# Patient Record
Sex: FEMALE | URBAN - METROPOLITAN AREA
[De-identification: names, ages, dates, MRNs, and addresses within clinical notes are randomized per-mention and may not be internally consistent; named-entity substitution may affect disease eponyms.]

---

## 2017-02-09 ENCOUNTER — OFFICE VISIT (OUTPATIENT)
Dept: PEDIATRICS | Facility: CLINIC | Age: 22
End: 2017-02-09
Payer: OTHER GOVERNMENT

## 2017-02-09 VITALS
SYSTOLIC BLOOD PRESSURE: 124 MMHG | OXYGEN SATURATION: 100 % | HEIGHT: 67 IN | TEMPERATURE: 97.9 F | DIASTOLIC BLOOD PRESSURE: 80 MMHG | WEIGHT: 148 LBS | HEART RATE: 74 BPM | BODY MASS INDEX: 23.23 KG/M2

## 2017-02-09 DIAGNOSIS — Z00.00 ROUTINE GENERAL MEDICAL EXAMINATION AT A HEALTH CARE FACILITY: Primary | ICD-10-CM

## 2017-02-09 DIAGNOSIS — Z23 FLU VACCINE NEED: ICD-10-CM

## 2017-02-09 DIAGNOSIS — Z11.3 SCREEN FOR STD (SEXUALLY TRANSMITTED DISEASE): ICD-10-CM

## 2017-02-09 DIAGNOSIS — Z87.09 HISTORY OF ASTHMA: ICD-10-CM

## 2017-02-09 DIAGNOSIS — Z12.4 SCREENING FOR MALIGNANT NEOPLASM OF CERVIX: ICD-10-CM

## 2017-02-09 PROCEDURE — G0145 SCR C/V CYTO,THINLAYER,RESCR: HCPCS | Performed by: INTERNAL MEDICINE

## 2017-02-09 PROCEDURE — 99395 PREV VISIT EST AGE 18-39: CPT | Mod: 25 | Performed by: INTERNAL MEDICINE

## 2017-02-09 PROCEDURE — 90686 IIV4 VACC NO PRSV 0.5 ML IM: CPT | Performed by: INTERNAL MEDICINE

## 2017-02-09 PROCEDURE — 87491 CHLMYD TRACH DNA AMP PROBE: CPT | Performed by: INTERNAL MEDICINE

## 2017-02-09 PROCEDURE — 90471 IMMUNIZATION ADMIN: CPT | Performed by: INTERNAL MEDICINE

## 2017-02-09 PROCEDURE — 87591 N.GONORRHOEAE DNA AMP PROB: CPT | Performed by: INTERNAL MEDICINE

## 2017-02-09 NOTE — LETTER
[address] [address]  [phone]      February 15, 2017    Amaris Murray  [address] [place] [address]          Dear Amaris,    I am happy to inform you that your recent cervical cancer screening test (PAP smear) was normal.      Preventative screening such as this helps insure your health for years to come.  This test should be repeated in 3 years unless otherwise directed.    You will still need to return to the clinic every year for your annual exam and other preventive tests.    Please contact the clinic if you have further questions.      Sincerely,    Mellissa Del Rio MD/shravan

## 2017-02-09 NOTE — MR AVS SNAPSHOT
After Visit Summary   2/9/2017    Amaris Murray    MRN: [id number]           Patient Information     Date Of Birth          1995        Visit Information        Provider Department      2/9/2017 4:40 PM Mellissa Del Rio MD [facility]        Today's Diagnoses     Routine general medical examination at a health care facility    -  1     Acne vulgaris         Flu vaccine need         History of asthma         Screen for STD (sexually transmitted disease)         Screening for malignant neoplasm of cervix           Care Instructions    Get urine test done today.     Preventive Health Recommendations  Female Ages 18 to 25     Yearly exam:     See your health care provider every year in order to  o Review health changes.   o Discuss preventive care.    o Review your medicines if your doctor has prescribed any.      You should be tested each year for STDs (sexually transmitted diseases).       After age 20, talk to your provider about how often you should have cholesterol testing.      Starting at age 21, get a Pap test every three years. If you have an abnormal result, your doctor may have you test more often.      If you are at risk for diabetes, you should have a diabetes test (fasting glucose).     Shots:     Get a flu shot each year.     Get a tetanus shot every 10 years.     Consider getting the shot (vaccine) that prevents cervical cancer (Gardasil).    Nutrition:     Eat at least 5 servings of fruits and vegetables each day.    Eat whole-grain bread, whole-wheat pasta and brown rice instead of white grains and rice.    Talk to your provider about Calcium and Vitamin D.     Lifestyle    Exercise at least 150 minutes a week each week (30 minutes a day, 5 days a week). This will help you control your weight and prevent disease.    Limit alcohol to one drink per day.    No smoking.     Wear sunscreen to prevent skin cancer.    See your dentist every six months for an exam and  "cleaning.        Follow-ups after your visit        Who to contact     If you have questions or need follow up information about today's clinic visit or your schedule please contact [facility] directly at [phone].  Normal or non-critical lab and imaging results will be communicated to you by MyChart, letter or phone within 4 business days after the clinic has received the results. If you do not hear from us within 7 days, please contact the clinic through MyChart or phone. If you have a critical or abnormal lab result, we will notify you by phone as soon as possible.  Submit refill requests through Jybe or call your pharmacy and they will forward the refill request to us. Please allow 3 business days for your refill to be completed.          Additional Information About Your Visit        Jybe Information     Jybe is an electronic gateway that provides easy, online access to your medical records. With Jybe, you can request a clinic appointment, read your test results, renew a prescription or communicate with your care team.     To sign up for Jybe visit the website at [email or web address]   You will be asked to enter the access code listed below, as well as some personal information. Please follow the directions to create your username and password.     Your access code is: [email or web address]  Expires: 5/10/2017  5:11 PM     Your access code will  in 90 days. If you need help or a new code, please contact your [facility] or call [phone] for assistance.        Care EveryWhere ID     This is your Care EveryWhere ID. This could be used by other organizations to access your [facility] medical records  [id number]        Your Vitals Were     Pulse Temperature Height BMI (Body Mass Index) Pulse Oximetry Last Period    74 97.9  F (36.6  C) (Temporal) 5' 6.75\" (1.695 m) 23.37 kg/m2 100% 2017       Blood Pressure from Last 3 Encounters: "   02/09/17 124/80   10/09/15 120/78   01/07/15 120/82    Weight from Last 3 Encounters:   02/09/17 148 lb (67.132 kg)   10/09/15 140 lb (63.504 kg) (68.92 %*)   01/07/15 145 lb (65.772 kg) (76.98 %*)     * Growth percentiles are based on [facility] 2-20 Years data.              We Performed the Following     CHLAMYDIA TRACHOMATIS PCR     HC FLU VAC PRESRV FREE QUAD SPLIT VIR 3+YRS IM     NEISSERIA GONORRHOEA PCR     Pap imaged thin layer screen only - recommended age 21 - 24 years        Primary Care Provider Office Phone # Fax #    Mellissa Del Rio [phone]       [facility] [address] [facility] [address]        Thank you!     Thank you for choosing [facility]  for your care. Our goal is always to provide you with excellent care. Hearing back from our patients is one way we can continue to improve our services. Please take a few minutes to complete the written survey that you may receive in the mail after your visit with us. Thank you!             Your Updated Medication List - Protect others around you: Learn how to safely use, store and throw away your medicines at www.disposemymeds.org.      Notice  As of 2/9/2017  5:11 PM    You have not been prescribed any medications.

## 2017-02-09 NOTE — Clinical Note
February 9, 2017      Amaris Murray  [address] [place] [address]              To Whom It May Concern,    Amaris Murray was diagnosed with exercise induced asthma during childhood. She has not had any problems in the last two years. The last refill of asthma medication was in July of 2014 but this was prescribed as preventive and it has never been used. She is a  and works out on a regular basis. She has not needed to use asthma medication for these strenuous activities. She has likely outgrown asthma.     Should you have any questions, please feel free to contact me at the address above.            Sincerely,        Mellissa Del Rio MD

## 2017-02-09 NOTE — NURSING NOTE
"Chief Complaint   Patient presents with     Physical     Needs letter stating no more Asthma, no problems with asthma for several years. needs clearance for the        Initial /80 mmHg  Pulse 74  Temp(Src) 97.9  F (36.6  C) (Temporal)  Ht 5' 6.75\" (1.695 m)  Wt 148 lb (67.132 kg)  BMI 23.37 kg/m2  SpO2 100%  LMP 01/20/2017 Estimated body mass index is 23.37 kg/(m^2) as calculated from the following:    Height as of this encounter: 5' 6.75\" (1.695 m).    Weight as of this encounter: 148 lb (67.132 kg).  Medication Reconciliation: complete    "

## 2017-02-09 NOTE — PROGRESS NOTES
SUBJECTIVE:     CC: Amaris Murray is an 21 year old woman who presents for preventive health visit.     Patient needs a letter for the [employer]-she has outgrown her asthma. Patient was diagnosed with exercise-induced asthma during childhood. She believes she has outgrown it. She has not used albuterol for a number of years. Per our chart, the last refill was in July 2014. Patient reported that that was a refill for just in case. She has never used it. 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 She is a , works out 3 times a week. She is also a . She has not needed albuterol for any of these strenuous exercises.    Healthy Habits:    Do you get at least three servings of calcium containing foods daily (dairy, green leafy vegetables, etc.)? yes    Amount of exercise or daily activities, outside of work: 3 day(s) per week    Problems taking medications regularly No    Medication side effects: No    Have you had an eye exam in the past two years? yes    Do you see a dentist twice per year? yes    Do you have sleep apnea, excessive snoring or daytime drowsiness?no        -------------------------------------    Today's PHQ-2 Score:   PHQ-2 ( 1999 Pf[employer]) 1/7/2015 1/6/2014   Q1: Little interest or pleasure in doing things 0 0   Q2: Feeling down, depressed or hopeless 0 0   PHQ-2 Score 0 0       Abuse: Current or Past(Physical, Sexual or Emotional)- No  Do you feel safe in your environment - Yes    Social History   Substance Use Topics     Smoking status: Never Smoker      Smokeless tobacco: Never Used     Alcohol Use: No     The patient does not drink >3 drinks per day nor >7 drinks per week.    Recent Labs   Lab Test 01/13/12   CHOL  146       Reviewed orders with patient.  Reviewed health maintenance and updated orders accordingly - Yes    Mammo Decision Support:  Mammogram not appropriate for this patient based on age.    Pertinent mammograms are reviewed under the imaging  "tab.  History of abnormal Pap smear: NO - age 21-29 PAP every 3 years recommended  All Histories reviewed and updated in Epic.      ROS:  C: NEGATIVE for fever, chills, change in weight  I: NEGATIVE for worrisome rashes, moles or lesions  E: NEGATIVE for vision changes or irritation  ENT: NEGATIVE for ear, mouth and throat problems  R: NEGATIVE for significant cough or SOB  B: NEGATIVE for masses, tenderness or discharge  CV: NEGATIVE for chest pain, palpitations or peripheral edema  GI: NEGATIVE for nausea, abdominal pain, heartburn, or change in bowel habits  : NEGATIVE for unusual urinary or vaginal symptoms. Periods are regular.  M: NEGATIVE for significant arthralgias or myalgia  N: NEGATIVE for weakness, dizziness or paresthesias  P: NEGATIVE for changes in mood or affect    Problem list, Medication list, Allergies, and Medical/Social/Surgical histories reviewed in [facility] and updated as appropriate.  OBJECTIVE:     /80 mmHg  Pulse 74  Temp(Src) 97.9  F (36.6  C) (Temporal)  Ht 5' 6.75\" (1.695 m)  Wt 148 lb (67.132 kg)  BMI 23.37 kg/m2  SpO2 100%  LMP 01/20/2017  EXAM:  GENERAL: healthy, alert and no distress  EYES: Eyes grossly normal to inspection, PERRL and conjunctivae and sclerae normal  HENT: ear canals and TM's normal, nose and mouth without ulcers or lesions  NECK: no adenopathy, no asymmetry, masses, or scars and thyroid normal to palpation  RESP: lungs clear to auscultation - no rales, rhonchi or wheezes  BREAST: normal without masses, tenderness or nipple discharge and no palpable axillary masses or adenopathy  CV: regular rate and rhythm, normal S1 S2, no S3 or S4, no murmur, click or rub, no peripheral edema and peripheral pulses strong  ABDOMEN: soft, nontender, no hepatosplenomegaly, no masses and bowel sounds normal  : Normal, no hernia.  Speculum:  Cervix normal,  Pap taken, bimanual exam,  no adenexal mass or tenderness.   MS: no gross musculoskeletal defects noted, no " "edema  SKIN: no suspicious lesions or rashes  NEURO: Normal strength and tone, mentation intact and speech normal  PSYCH: mentation appears normal, affect normal/bright    ASSESSMENT/PLAN:         ICD-10-CM    1. Routine general medical examination at a health care facility Z00.00    2. Flu vaccine need Z23 HC FLU VAC PRESRV FREE QUAD SPLIT VIR 3+YRS IM   3. History of asthma Z87.09    4. Screen for STD (sexually transmitted disease) Z11.3 NEISSERIA GONORRHOEA PCR     CHLAMYDIA TRACHOMATIS PCR     CANCELED: NEISSERIA GONORRHOEA PCR     CANCELED: CHLAMYDIA TRACHOMATIS PCR   5. Screening for malignant neoplasm of cervix Z12.4 Pap imaged thin layer screen only - recommended age 21 - 24 years     -- letter written for history of exercise-induced asthma.    COUNSELING:   Reviewed preventive health counseling, as reflected in patient instructions         reports that she has never smoked. She has never used smokeless tobacco.    Estimated body mass index is 23.37 kg/(m^2) as calculated from the following:    Height as of this encounter: 5' 6.75\" (1.695 m).    Weight as of this encounter: 148 lb (67.132 kg).       Counseling Resources:  ATP IV Guidelines  Pooled Cohorts Equation Calculator  Breast Cancer Risk Calculator  FRAX Risk Assessment  ICSI Preventive Guidelines  Dietary Guidelines for Americans, 2010  USDA's MyPlate  ASA Prophylaxis  Lung CA Screening    Mellissa Del Rio MD, MD  [facility]  "

## 2017-02-09 NOTE — PATIENT INSTRUCTIONS
Get urine test done today.     Preventive Health Recommendations  Female Ages 18 to 25     Yearly exam:     See your health care provider every year in order to  o Review health changes.   o Discuss preventive care.    o Review your medicines if your doctor has prescribed any.      You should be tested each year for STDs (sexually transmitted diseases).       After age 20, talk to your provider about how often you should have cholesterol testing.      Starting at age 21, get a Pap test every three years. If you have an abnormal result, your doctor may have you test more often.      If you are at risk for diabetes, you should have a diabetes test (fasting glucose).     Shots:     Get a flu shot each year.     Get a tetanus shot every 10 years.     Consider getting the shot (vaccine) that prevents cervical cancer (Gardasil).    Nutrition:     Eat at least 5 servings of fruits and vegetables each day.    Eat whole-grain bread, whole-wheat pasta and brown rice instead of white grains and rice.    Talk to your provider about Calcium and Vitamin D.     Lifestyle    Exercise at least 150 minutes a week each week (30 minutes a day, 5 days a week). This will help you control your weight and prevent disease.    Limit alcohol to one drink per day.    No smoking.     Wear sunscreen to prevent skin cancer.    See your dentist every six months for an exam and cleaning.

## 2017-02-10 LAB
C TRACH DNA SPEC QL NAA+PROBE: NORMAL
N GONORRHOEA DNA SPEC QL NAA+PROBE: NORMAL
SPECIMEN SOURCE: NORMAL
SPECIMEN SOURCE: NORMAL

## 2017-02-10 NOTE — PROGRESS NOTES
Quick Note:    Dear Amaris,   Here are your recent results which are within the expected range. Please continue with your current plan of care.   Please call or Mychart to our office if you have further questions.     Mellissa Del Rio MD  ______

## 2017-02-13 LAB
COPATH REPORT: NORMAL
PAP: NORMAL

## 2017-02-18 ASSESSMENT — ASTHMA QUESTIONNAIRES: ACT_TOTALSCORE: 25

## 2020-03-02 ENCOUNTER — HEALTH MAINTENANCE LETTER (OUTPATIENT)
Age: 25
End: 2020-03-02

## 2020-12-14 ENCOUNTER — HEALTH MAINTENANCE LETTER (OUTPATIENT)
Age: 25
End: 2020-12-14

## 2021-04-18 ENCOUNTER — HEALTH MAINTENANCE LETTER (OUTPATIENT)
Age: 26
End: 2021-04-18

## 2021-10-02 ENCOUNTER — HEALTH MAINTENANCE LETTER (OUTPATIENT)
Age: 26
End: 2021-10-02

## 2022-05-14 ENCOUNTER — HEALTH MAINTENANCE LETTER (OUTPATIENT)
Age: 27
End: 2022-05-14

## 2022-09-03 ENCOUNTER — HEALTH MAINTENANCE LETTER (OUTPATIENT)
Age: 27
End: 2022-09-03

## 2023-06-03 ENCOUNTER — HEALTH MAINTENANCE LETTER (OUTPATIENT)
Age: 28
End: 2023-06-03